# Patient Record
Sex: FEMALE | Race: ASIAN | NOT HISPANIC OR LATINO | Employment: PART TIME | URBAN - METROPOLITAN AREA
[De-identification: names, ages, dates, MRNs, and addresses within clinical notes are randomized per-mention and may not be internally consistent; named-entity substitution may affect disease eponyms.]

---

## 2019-04-02 ENCOUNTER — HOSPITAL ENCOUNTER (EMERGENCY)
Facility: MEDICAL CENTER | Age: 49
End: 2019-04-02

## 2019-04-02 VITALS
RESPIRATION RATE: 16 BRPM | SYSTOLIC BLOOD PRESSURE: 106 MMHG | OXYGEN SATURATION: 98 % | DIASTOLIC BLOOD PRESSURE: 71 MMHG | TEMPERATURE: 98.1 F | HEART RATE: 77 BPM | HEIGHT: 63 IN

## 2019-04-02 LAB — EKG IMPRESSION: NORMAL

## 2019-04-02 PROCEDURE — 302449 STATCHG TRIAGE ONLY (STATISTIC)

## 2019-04-02 PROCEDURE — 93005 ELECTROCARDIOGRAM TRACING: CPT

## 2020-04-13 ENCOUNTER — OCCUPATIONAL MEDICINE (OUTPATIENT)
Dept: URGENT CARE | Facility: CLINIC | Age: 50
End: 2020-04-13
Payer: OTHER MISCELLANEOUS

## 2020-04-13 VITALS
RESPIRATION RATE: 15 BRPM | WEIGHT: 150.3 LBS | SYSTOLIC BLOOD PRESSURE: 116 MMHG | BODY MASS INDEX: 25.04 KG/M2 | TEMPERATURE: 98.3 F | HEIGHT: 65 IN | HEART RATE: 77 BPM | DIASTOLIC BLOOD PRESSURE: 74 MMHG | OXYGEN SATURATION: 96 %

## 2020-04-13 DIAGNOSIS — S61.012A LACERATION OF LEFT THUMB WITHOUT FOREIGN BODY WITHOUT DAMAGE TO NAIL, INITIAL ENCOUNTER: ICD-10-CM

## 2020-04-13 PROCEDURE — 90471 IMMUNIZATION ADMIN: CPT | Performed by: FAMILY MEDICINE

## 2020-04-13 PROCEDURE — 90715 TDAP VACCINE 7 YRS/> IM: CPT | Performed by: FAMILY MEDICINE

## 2020-04-13 PROCEDURE — 12001 RPR S/N/AX/GEN/TRNK 2.5CM/<: CPT | Mod: FA | Performed by: FAMILY MEDICINE

## 2020-04-13 ASSESSMENT — PAIN SCALES - GENERAL
PAINLEVEL: 6=MODERATE PAIN
PAINLEVEL: 6=MODERATE PAIN

## 2020-04-13 ASSESSMENT — ENCOUNTER SYMPTOMS
FOCAL WEAKNESS: 0
COUGH: 0
SENSORY CHANGE: 0
FEVER: 0
TINGLING: 0

## 2020-04-13 NOTE — PATIENT INSTRUCTIONS
Laceration Care, Adult  A laceration is a cut that goes through all layers of the skin. The cut also goes into the tissue that is right under the skin. Some cuts heal on their own. Others need to be closed with stitches (sutures), staples, skin adhesive strips, or wound glue. Taking care of your cut lowers your risk of infection and helps your cut to heal better.  HOW TO TAKE CARE OF YOUR CUT  For stitches or staples:  · Keep the wound clean and dry.  · If you were given a bandage (dressing), you should change it at least one time per day or as told by your doctor. You should also change it if it gets wet or dirty.  · Keep the wound completely dry for the first 24 hours or as told by your doctor. After that time, you may take a shower or a bath. However, make sure that the wound is not soaked in water until after the stitches or staples have been removed.  · Clean the wound one time each day or as told by your doctor:  ¨ Wash the wound with soap and water.  ¨ Rinse the wound with water until all of the soap comes off.  ¨ Pat the wound dry with a clean towel. Do not rub the wound.  · After you clean the wound, put a thin layer of antibiotic ointment on it as told by your doctor. This ointment:  ¨ Helps to prevent infection.  ¨ Keeps the bandage from sticking to the wound.  · Have your stitches or staples removed as told by your doctor.  If your doctor used skin adhesive strips:   · Keep the wound clean and dry.  · If you were given a bandage, you should change it at least one time per day or as told by your doctor. You should also change it if it gets dirty or wet.  · Do not get the skin adhesive strips wet. You can take a shower or a bath, but be careful to keep the wound dry.  · If the wound gets wet, pat it dry with a clean towel. Do not rub the wound.  · Skin adhesive strips fall off on their own. You can trim the strips as the wound heals. Do not remove any strips that are still stuck to the wound. They will  fall off after a while.  If your doctor used wound glue:  · Try to keep your wound dry, but you may briefly wet it in the shower or bath. Do not soak the wound in water, such as by swimming.  · After you take a shower or a bath, gently pat the wound dry with a clean towel. Do not rub the wound.  · Do not do any activities that will make you really sweaty until the skin glue has fallen off on its own.  · Do not apply liquid, cream, or ointment medicine to your wound while the skin glue is still on.  · If you were given a bandage, you should change it at least one time per day or as told by your doctor. You should also change it if it gets dirty or wet.  · If a bandage is placed over the wound, do not let the tape for the bandage touch the skin glue.  · Do not pick at the glue. The skin glue usually stays on for 5-10 days. Then, it falls off of the skin.  General Instructions   · To help prevent scarring, make sure to cover your wound with sunscreen whenever you are outside after stitches are removed, after adhesive strips are removed, or when wound glue stays in place and the wound is healed. Make sure to wear a sunscreen of at least 30 SPF.  · Take over-the-counter and prescription medicines only as told by your doctor.  · If you were given antibiotic medicine or ointment, take or apply it as told by your doctor. Do not stop using the antibiotic even if your wound is getting better.  · Do not scratch or pick at the wound.  · Keep all follow-up visits as told by your doctor. This is important.  · Check your wound every day for signs of infection. Watch for:  ¨ Redness, swelling, or pain.  ¨ Fluid, blood, or pus.  · Raise (elevate) the injured area above the level of your heart while you are sitting or lying down, if possible.  GET HELP IF:  · You got a tetanus shot and you have any of these problems at the injection site:  ¨ Swelling.  ¨ Very bad pain.  ¨ Redness.  ¨ Bleeding.  · You have a fever.  · A wound that was  closed breaks open.  · You notice a bad smell coming from your wound or your bandage.  · You notice something coming out of the wound, such as wood or glass.  · Medicine does not help your pain.  · You have more redness, swelling, or pain at the site of your wound.  · You have fluid, blood, or pus coming from your wound.  · You notice a change in the color of your skin near your wound.  · You need to change the bandage often because fluid, blood, or pus is coming from the wound.  · You start to have a new rash.  · You start to have numbness around the wound.  GET HELP RIGHT AWAY IF:  · You have very bad swelling around the wound.  · Your pain suddenly gets worse and is very bad.  · You notice painful lumps near the wound or on skin that is anywhere on your body.  · You have a red streak going away from your wound.  · The wound is on your hand or foot and you cannot move a finger or toe like you usually can.  · The wound is on your hand or foot and you notice that your fingers or toes look pale or bluish.     This information is not intended to replace advice given to you by your health care provider. Make sure you discuss any questions you have with your health care provider.     Document Released: 06/05/2009 Document Revised: 05/03/2016 Document Reviewed: 12/14/2015  [employer] Interactive Patient Education ©2016 [employer]

## 2020-04-13 NOTE — LETTER
[facility] [address] - [place] [address]  Phone:  [phone] - Fax:  [phone]   [facility] Progress Report and Disability Certification  Date of Service: 4/13/2020   No Show:  No  Date / Time of Next Visit: 4/20/2020   Claim Information   Patient Name: Theron Walker  Claim Number:     Employer:   [employer] Date of Injury: 4/13/2020     Insurer / TPA: EMPLOYERS PREFERRED ID / SSN:     Occupation:   Diagnosis: The encounter diagnosis was Laceration of left thumb without foreign body without damage to nail, initial encounter.    Medical Information   Related to Industrial Injury? Yes    Subjective Complaints:  Date of injury 4/13/2020.  49-year-old  presents with a chief complaint of left thumb tip pad circumferential laceration sustained today 4/13/2020 when cutting fish with a [employer] knife.  There was initial copious bleeding and the employee applied pressure dressing with intermittent persistent bleeding.  Patient denies loss of motor strength in the thumb.  Patient denies loss of sensation in the tip of the thumb.   Objective Findings: Left thumb tip pad: 1 cm circumferential laceration with minimal bleeding, no foreign body, neurovascularly intact, motor strength 5 out of 5 to left thumb flexion and extension at the IP and MCP joint.   Pre-Existing Condition(s):     Assessment:   Initial Visit    Status: Additional Care Required  Permanent Disability:No    Plan:   Comments:Wound cautery and wound dressing    Diagnostics:   Comments:Not applicable    Comments:       Disability Information   Status: Released to Restricted Duty    From:  4/13/2020  Through: 4/20/2020 Restrictions are: Temporary   Physical Restrictions   Sitting:    Standing:    Stooping:    Bending:      Squatting:    Walking:    Climbing:    Pushing:      Pulling:    Other:    Reaching Above Shoulder (L):   Reaching Above Shoulder (R):       Reaching  Below Shoulder (L):    Reaching Below Shoulder (R):      Not to exceed Weight Limits   Carrying(hrs):   Weight Limit(lb):   Lifting(hrs):   Weight  Limit(lb):     Comments: No use of left upper extremity, keep wound clean dry and intact dressing clean and dry    Repetitive Actions   Hands: i.e. Fine Manipulations from Grasping:     Feet: i.e. Operating Foot Controls:     Driving / Operate Machinery:     Physician Name: Bright West M.D. Physician Signature: BRIGHT Christianson M.D. e-Signature: Dr. Esdras Serna, Medical Director   Clinic Name / Location: [address] [place] [place] [address] Clinic Phone Number: Dept: [phone]   Appointment Time: 2:00 Pm Visit Start Time: 2:17 PM   Check-In Time:  2:15 Pm Visit Discharge Time:  ***   Original-Treating Physician or Chiropractor    Page 2-Insurer/TPA    Page 3-Employer    Page 4-Employee

## 2020-04-13 NOTE — PROGRESS NOTES
"Subjective:   Theron Walker is a 49 y.o. female who presents for Laceration (cut left thumb with knife at work today.)    Date of injury 4/13/2020.  49-year-old  presents with a chief complaint of left thumb tip pad circumferential laceration sustained today 4/13/2020 when cutting fish with a sushi knife.  There was initial copious bleeding and the employee applied pressure dressing with intermittent persistent bleeding.  Patient denies loss of motor strength in the thumb.  Patient denies loss of sensation in the tip of the thumb.   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  See the C4 and D-39 forms.    PMH:  has no past medical history on file.  MEDS: No current outpatient medications on file.  ALLERGIES: No Known Allergies  SURGHX: No past surgical history on file.  SOCHX:  reports that she has never smoked. She has never used smokeless tobacco.  FH: Family history was reviewed  Review of Systems   Constitutional: Negative for fever.   Respiratory: Negative for cough.    Neurological: Negative for tingling, sensory change and focal weakness.   All other systems reviewed and are negative.       Objective:   Pulse 77   Temp 36.8 °C (98.3 °F)   Resp 15   Ht 1.651 m (5' 5\")   Wt 68.2 kg (150 lb 4.8 oz)   LMP  (LMP Unknown)   SpO2 96%   Breastfeeding No   BMI 25.01 kg/m²   Physical Exam  Vitals signs and nursing note reviewed.   Constitutional:       General: She is not in acute distress.     Appearance: She is well-developed.   HENT:      Head: Normocephalic and atraumatic.      Right Ear: External ear normal.      Left Ear: External ear normal.      Nose: Nose normal.      Mouth/Throat:      Mouth: Mucous membranes are moist.   Eyes:      Conjunctiva/sclera: Conjunctivae normal.   Cardiovascular:      Rate and Rhythm: Normal rate.      Heart sounds: No murmur.   Pulmonary: "      Effort: Pulmonary effort is normal. No respiratory distress.      Breath sounds: Normal breath sounds.   Abdominal:      General: There is no distension.   Musculoskeletal: Normal range of motion.   Skin:     General: Skin is warm and dry.   Neurological:      General: No focal deficit present.      Mental Status: She is alert and oriented to person, place, and time. Mental status is at baseline.      Gait: Gait (gait at baseline) normal.   Psychiatric:         Judgment: Judgment normal.       Left thumb tip pad: 1 cm circumferential laceration with minimal bleeding, no foreign body, neurovascularly intact, motor strength 5 out of 5 to left thumb flexion and extension at the IP and MCP joint.   Assessment/Plan:   1. Laceration of left thumb without foreign body without damage to nail, initial encounter  - Tdap =>6yo IM      See C4 and D39 forms.  Advised keeping wound dressing clean and dry and no use of the left upper extremity.

## 2020-04-13 NOTE — PROCEDURES
Left finger tip pad wound management  Date/Time: 4/13/2020 2:49 PM  Performed by: Bright West M.D.  Authorized by: Bright West M.D.   Preparation: Patient was prepped and draped in the usual sterile fashion.  Local anesthesia used: no    Anesthesia:  Local anesthesia used: no  Patient tolerance: Patient tolerated the procedure well with no immediate complications  Comments: Pressure was applied to the wound and then Drysol was applied directly to the wound with achievement of adequate hemostasis.  A pressure dressing was applied and the patient was observed for 15 minutes with no further bleeding noted adequate hemostasis had been obtained.

## 2020-04-13 NOTE — LETTER
"EMPLOYEE’S CLAIM FOR COMPENSATION/ REPORT OF INITIAL TREATMENT  FORM C-4    EMPLOYEE’S CLAIM - PROVIDE ALL INFORMATION REQUESTED   First Name  Theron Last Name  Denise Birthdate                    1970                Sex  female Claim Number   Home Address  [address] Age  49 y.o. 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 (if applicable)  cut fish    How did this injury or occupational disease occur? (Be specific an answer in detail. Use additional sheet if necessary)  cutting fish   If you believe that you have an occupational disease, when did you first have knowledge of the disability and it relationship to your employment?  n/a Witnesses to the Accident  Tuhiga      Nature of Injury or Occupational Disease  Laceration  Part(s) of Body Injured or Affected  Thumb (L), N/A, N/A    I certify that the above is true and correct to the best of my knowledge and that I have provided this information in order to obtain the benefits of [place]  I hereby authorize any physician, chiropractor, surgeon, practitioner, or other person, any hospital, including [facility] or [facility], any medical service organization, any insurance company, or other institution or organization to release to each other, any medical or other information, including benefits paid or payable, pertinent to this injury or disease, except information relative to diagnosis, treatment and/or counseling for AIDS, psychological conditions, alcohol or controlled substances, for which I must give specific authorization.  A Photostat of this authorization shall be as valid as the original.     Date4/13/2020   [facility]   Employee’s Signature   THIS REPORT MUST BE COMPLETED AND MAILED WITHIN 3 WORKING DAYS OF TREATMENT   [facility]  Name of [facility]   Date  4/13/2020 Diagnosis  (S61.012A) Laceration of left thumb without foreign body without damage to nail, initial encounter Is there evidence the injured employee was under the influence of alcohol and/or another controlled substance at the time of accident?   Hour  2:17 PM Description of Injury or Disease  The encounter diagnosis was Laceration of left thumb without foreign body without damage to nail, initial encounter. No   Treatment  Wound dressing  Have you advised the patient to remain off work five days or more?     X-Ray Findings    Comments:Not applicable   If Yes   From Date  To Date      From information given by the employee, together with medical evidence, can you directly connect this injury or occupational disease as job incurred?  Yes If No Full Duty No Modified Duty  Yes   Is additional medical care by a physician indicated?  Yes If Modified Duty, Specify any Limitations / Restrictions  No use of left upper extremity   Do you know of any previous injury or disease contributing to this condition or occupational disease?     "                        No   Date  4/13/2020 Print Doctor’s Name Bright West M.D. I certify the employer’s copy of  this form was mailed on:   Address  [address] Insurer’s Use Only     [facility] Zip  [address]    Provider’s Tax ID Number  [id number] Telephone  Dept: [phone]        milli-BRIGHT Robles M.D.   e-Signature: Dr. Esdras Serna,   Medical Director Degree  MD        ORIGINAL-TREATING PHYSICIAN OR CHIROPRACTOR    PAGE 2-INSURER/TPA    PAGE 3-EMPLOYER    PAGE 4-EMPLOYEE             Form C-4 (rev.10/07)              BRIEF DESCRIPTION OF RIGHTS AND BENEFITS  (Pursuant to [place] 616C.050)    Notice of Injury or Occupational Disease (Incident Report Form C-1): If an injury or occupational disease (OD) arises out of and in the course of employment, you must provide written notice to your employer as soon as practicable, but no later than 7 days after the accident or OD. Your employer shall maintain a sufficient supply of the required forms.    Claim for Compensation (Form C-4): If medical treatment is sought, the form C-4 is available at the place of initial treatment. A completed \"Claim for Compensation\" (Form C-4) must be filed within 90 days after an accident or OD. The treating physician or chiropractor must, within 3 working days after treatment, complete and mail to the employer, the employer's insurer and third-party , the Claim for Compensation.    Medical Treatment: If you require medical treatment for your on-the-job injury or OD, you may be required to select a physician or chiropractor from a list provided by your workers’ compensation insurer, if it has contracted with an Organization for Managed Care (MCO) or Preferred Provider Organization (PPO) or providers of health care. If your employer has not entered into a contract with an MCO or PPO, you may select a physician or chiropractor from the Panel of Physicians and Chiropractors. Any " medical costs related to your industrial injury or OD will be paid by your insurer.    Temporary Total Disability (TTD): If your doctor has certified that you are unable to work for a period of at least 5 consecutive days, or 5 cumulative days in a 20-day period, or places restrictions on you that your employer does not accommodate, you may be entitled to TTD compensation.    Temporary Partial Disability (TPD): If the wage you receive upon reemployment is less than the compensation for TTD to which you are entitled, the insurer may be required to pay you TPD compensation to make up the difference. TPD can only be paid for a maximum of 24 months.    Permanent Partial Disability (PPD): When your medical condition is stable and there is an indication of a PPD as a result of your injury or OD, within 30 days, your insurer must arrange for an evaluation by a rating physician or chiropractor to determine the degree of your PPD. The amount of your PPD award depends on the date of injury, the results of the PPD evaluation and your age and wage.    Permanent Total Disability (PTD): If you are medically certified by a treating physician or chiropractor as permanently and totally disabled and have been granted a PTD status by your insurer, you are entitled to receive monthly benefits not to exceed 66 2/3% of your average monthly wage. The amount of your PTD payments is subject to reduction if you previously received a PPD award.    Vocational Rehabilitation Services: You may be eligible for vocational rehabilitation services if you are unable to return to the job due to a permanent physical impairment or permanent restrictions as a result of your injury or occupational disease.    Transportation and Per Tanya Reimbursement: You may be eligible for travel expenses and per tanya associated with medical treatment.    Reopening: You may be able to reopen your claim if your condition worsens after claim closure.    Appeal Process: If  you disagree with a written determination issued by the insurer or the insurer does not respond to your request, you may appeal to the Department of Administration, , by following the instructions contained in your determination letter. You must appeal the determination within 70 days from the date of the determination letter at [address], [address], [place], [place] [address], or [address], [address], [place], [place] [address]. If you disagree with the  decision, you may appeal to the Department of Administration, . You must file your appeal within 30 days from the date of the  decision letter at [address], [address], [place], [place] [address], or [address], [address], [place], [place] [address]. If you disagree with a decision of an , you may file a petition for judicial review with the District Court. You must do so within 30 days of the Appeal Officer’s decision. You may be represented by an  at your own expense or you may contact the [facility] for possible representation.    [place]  for Injured Workers (NAIW): If you disagree with a  decision, you may request that NAIW represent you without charge at an  Hearing. For information regarding denial of benefits, you may contact the [facility] at: [address], [address], [place] [address], [phone], or [address], [address], [place] [address], [phone]    To File a Complaint with the Division: If you wish to file a complaint with the  of the Division of Industrial Relations (DIR),  please contact the Workers’ Compensation Section, [address], [address], [place], [place] [address], telephone (864) [phone], or [address], [address], [place], [place] [address], telephone [phone].    For assistance with Workers’ Compensation Issues: You may  contact the Office of the Governor Consumer Health Assistance, [address] [place] [address], [address], [place], [place] [address], Toll Free 1-932.303.9739, Web site: http://govcha.[facility].[place], E-mail valarie@[facility].[email or web address].                   __________________________________________________________________                                                     ___4/13/2020______        Employee Name / Signature                                                                                                                                              Date                                                                                                                                                                                                     D-2 (rev. 06/18)

## 2020-04-20 ENCOUNTER — OCCUPATIONAL MEDICINE (OUTPATIENT)
Dept: URGENT CARE | Facility: CLINIC | Age: 50
End: 2020-04-20
Payer: OTHER MISCELLANEOUS

## 2020-04-20 VITALS
BODY MASS INDEX: 24.99 KG/M2 | SYSTOLIC BLOOD PRESSURE: 104 MMHG | TEMPERATURE: 98 F | HEART RATE: 70 BPM | RESPIRATION RATE: 20 BRPM | HEIGHT: 65 IN | WEIGHT: 150 LBS | DIASTOLIC BLOOD PRESSURE: 70 MMHG | OXYGEN SATURATION: 97 %

## 2020-04-20 DIAGNOSIS — S61.012A LACERATION OF LEFT THUMB WITHOUT FOREIGN BODY WITHOUT DAMAGE TO NAIL, INITIAL ENCOUNTER: ICD-10-CM

## 2020-04-20 PROCEDURE — 99213 OFFICE O/P EST LOW 20 MIN: CPT | Performed by: PHYSICIAN ASSISTANT

## 2020-04-20 RX ORDER — NAPROXEN 250 MG/1
250 TABLET ORAL 2 TIMES DAILY WITH MEALS
COMMUNITY

## 2020-04-20 RX ORDER — SULFAMETHOXAZOLE AND TRIMETHOPRIM 800; 160 MG/1; MG/1
1 TABLET ORAL 2 TIMES DAILY
Qty: 14 TAB | Refills: 0 | Status: SHIPPED | OUTPATIENT
Start: 2020-04-20 | End: 2020-04-27

## 2020-04-20 ASSESSMENT — ENCOUNTER SYMPTOMS
MYALGIAS: 0
SHORTNESS OF BREATH: 0
VOMITING: 0
CHILLS: 0
NAUSEA: 0
COUGH: 0
FEVER: 0
ABDOMINAL PAIN: 0
HEADACHES: 0
DIZZINESS: 0

## 2020-04-20 NOTE — LETTER
[facility] [address] - [place] [address]  Phone:  [phone] - Fax:  [phone]   [facility] Progress Report and Disability Certification  Date of Service: 4/20/2020   No Show:  No  Date / Time of Next Visit:     Claim Information   Patient Name: Theron Walker  Claim Number:     Employer:   *** Date of Injury: 4/13/2020     Insurer / TPA: [employer] *** ID / SSN:     Occupation:  *** Diagnosis: The encounter diagnosis was Laceration of left thumb without foreign body without damage to nail, initial encounter.    Medical Information   Related to Industrial Injury? Yes ***   Subjective Complaints:  HPI First Visit Copied: Date of injury 4/13/2020.  49-year-old  presents with a chief complaint of left thumb tip pad circumferential laceration sustained today 4/13/2020 when cutting fish with a [employer] knife.  There was initial copious bleeding and the employee applied pressure dressing with intermittent persistent bleeding.  Patient denies loss of motor strength in the thumb.  Patient denies loss of sensation in the tip of the thumb.     HPI: This is a 50-year-old female presenting for follow-up after sustaining a left thumb circumferential laceration on 4/13/2020.  The patient is still experiencing pain that she describes as a constant throb with no radiating pain up the left arm.  She continues to use over-the-counter NSAIDs for pain with minimal relief.  She informs me she has not changed her original bandage that has been on for over 1 week.  She denies any loss of sensation to the left hand or digits.  She denies any fevers chills, nausea or vomiting.   Objective Findings: Physical Exam  Constitutional:       Appearance: Normal appearance. She is not toxic-appearing.   HENT:      Head: Normocephalic and atraumatic.   Eyes:      Conjunctiva/sclera: Conjunctivae normal.   Cardiovascular:      Rate and Rhythm:  Normal rate and regular rhythm.      Pulses: Normal pulses.   Pulmonary:      Effort: Pulmonary effort is normal.      Breath sounds: Normal breath sounds. No wheezing.   Musculoskeletal:      Comments: Limited range of motion to the left first digit due to pain.  Limited flexion and extension to the MCP and IP joints.  Mild tenderness to palpation over the left first digit.  Full sensation to the bilateral upper extremities and digits.   Skin:     Comments: Open circumferential laceration of the left distal thumb.  Mild erythema and edema to the left thumb.  No streaking appreciated.  Capillary refill less than 2 seconds to the bilateral upper extremities.   Neurological:      Mental Status: She is alert.        Pre-Existing Condition(s):     Assessment:   Condition Improved    Status: Additional Care Required  Permanent Disability:No    Plan: Medication  Comments:Daily dressing changes with topical antibiotic application.  Allow the wound to dry in her for 2 to 3 hours a day.  Bactrim started.  Over-the-counter NSAIDs and Tylenol for pain relief.     Diagnostics:      Comments:  No use of left upper extremity.  Return to clinic on 4/27/2020 for follow-up.  Keep wound clean and dry.    Disability Information   Status: Released to Restricted Duty    From:     Through:   Restrictions are:     Physical Restrictions   Sitting:    Standing:    Stooping:    Bending:      Squatting:    Walking:    Climbing:    Pushing:      Pulling:    Other:    Reaching Above Shoulder (L):   Reaching Above Shoulder (R):       Reaching Below Shoulder (L):    Reaching Below Shoulder (R):      Not to exceed Weight Limits   Carrying(hrs):   Weight Limit(lb):   Lifting(hrs):   Weight  Limit(lb):     Comments: No use of the left upper extremity for the next week.  Follow-up on 4/27/2020.  Keep wound clean and dry.    Repetitive Actions   Hands: i.e. Fine Manipulations from Grasping:     Feet: i.e. Operating Foot Controls:     Driving / Operate  Machinery:     Physician Name: Vinnie Jay P.A.-C. Physician Signature:   e-Signature: Dr. Esdras Serna, Medical Director   Clinic Name / Location: [address]  [place] [address] Clinic Phone Number: Dept: [phone]   Appointment Time: 11:15 Am Visit Start Time: 11:21 AM   Check-In Time:  11:14 Am Visit Discharge Time:  ***   Original-Treating Physician or Chiropractor    Page 2-Insurer/TPA    Page 3-Employer    Page 4-Employee

## 2020-04-20 NOTE — PROGRESS NOTES
Subjective:      Theron Walker is a 50 y.o. female who presents with Follow-Up (DOI: 04/13/20, Left hand-thumb, patient still feels pain, difficulty sleeping from it)            HPI Subjective Complaints: HPI First Visit Copied: Date of injury 4/13/2020.  49-year-old  presents with a chief complaint of left thumb tip pad circumferential laceration sustained today 4/13/2020 when cutting fish with a [employer] knife.  There was initial copious bleeding and the employee applied pressure dressing with intermittent persistent bleeding.  Patient denies loss of motor strength in the thumb.  Patient denies loss of sensation in the tip of the thumb.     HPI: This is a 50-year-old female presenting for follow-up after sustaining a left thumb circumferential laceration on 4/13/2020.  The patient is still experiencing pain that she describes as a constant throb with no radiating pain up the left arm.  She continues to use over-the-counter NSAIDs for pain with minimal relief.  She informs me she has not changed her original bandage that has been on for over 1 week.  She denies any loss of sensation to the left hand or digits.  She denies any fevers chills, nausea or vomiting.      PMH:  has no past medical history on file.  MEDS:   Current Outpatient Medications:   •  naproxen (NAPROSYN) 250 MG Tab, Take 250 mg by mouth 2 times a day, with meals., Disp: , Rfl:   ALLERGIES: No Known Allergies  SURGHX: History reviewed. No pertinent surgical history.  SOCHX:  reports that she has never smoked. She has never used smokeless tobacco. She reports previous alcohol use. She reports that she does not use drugs.  FH: Family history was reviewed, no pertinent findings to report      Review of Systems   Constitutional: Negative for chills and fever.   Respiratory: Negative for cough and shortness of breath.    Cardiovascular: Negative for chest pain.   Gastrointestinal: Negative for abdominal pain, nausea and vomiting.  "  Musculoskeletal: Negative for myalgias.        Pain with movement of the left Thumb. No loss of sensation.    Skin: Negative for rash.        Open laceration Distal left thumb. No streaking noted.    Neurological: Negative for dizziness and headaches.          Objective:     /70 (BP Location: Right arm, Patient Position: Sitting, BP Cuff Size: Adult)   Pulse 70   Temp 36.7 °C (98 °F) (Temporal)   Resp 20   Ht 1.651 m (5' 5\")   Wt 68 kg (150 lb)   LMP  (LMP Unknown)   SpO2 97%   BMI 24.96 kg/m²      Physical Exam  Constitutional:       Appearance: Normal appearance. She is not toxic-appearing.   HENT:      Head: Normocephalic and atraumatic.   Eyes:      Conjunctiva/sclera: Conjunctivae normal.   Cardiovascular:      Rate and Rhythm: Normal rate and regular rhythm.      Pulses: Normal pulses.   Pulmonary:      Effort: Pulmonary effort is normal.      Breath sounds: Normal breath sounds. No wheezing.   Musculoskeletal:      Comments: Limited range of motion to the left first digit due to pain.  Limited flexion and extension to the MCP and IP joints.  Mild tenderness to palpation over the left first digit.  Full sensation to the bilateral upper extremities and digits.   Skin:     Comments: Open circumferential laceration of the left distal thumb.  Mild erythema and edema to the left thumb.  No streaking appreciated.  Capillary refill less than 2 seconds to the bilateral upper extremities.   Neurological:      Mental Status: She is alert.                 Assessment/Plan:     1. Laceration of left thumb without foreign body without damage to nail, initial encounter    - sulfamethoxazole-trimethoprim (BACTRIM DS) 800-160 MG tablet; Take 1 Tab by mouth 2 times a day for 7 days.  Dispense: 14 Tab; Refill: 0    Patient was educated on wound care instructions.  Her niece help translate the instructions and instructions were provided in writing.  She was advised to daily change her wound dressing, apply " antibiotic lotion and allow the wound to be open to air for 2 to 3 hours/day.    Take oral antibiotics as prescribed.    Patient was instructed to return to the clinic if symptoms worsen or she notices any signs of infection such as increasing redness, fevers, chills, nausea or vomiting.    Differential diagnoses, supportive care, and indications for immediate follow-up discussed with patient.   Instructed to return to clinic or nearest emergency department for any change in condition, further concerns, or worsening of symptoms.

## 2020-04-20 NOTE — LETTER
[facility]  [address] - [place] [address]  Phone:  [phone] - Fax:  [phone]   [facility] Progress Report and Disability Certification  Date of Service: 4/20/2020   No Show:  No  Date / Time of Next Visit: 4/27/2020   Claim Information   Patient Name: Theron Walker  Claim Number:     Employer:   [employer] Date of Injury: 4/13/2020     Insurer / TPA: [employer] ID / SSN:     Occupation:   Diagnosis: The encounter diagnosis was Laceration of left thumb without foreign body without damage to nail, initial encounter.    Medical Information   Related to Industrial Injury? Yes    Subjective Complaints:  HPI First Visit Copied: Date of injury 4/13/2020.  49-year-old  presents with a chief complaint of left thumb tip pad circumferential laceration sustained today 4/13/2020 when cutting fish with a [employer] knife.  There was initial copious bleeding and the employee applied pressure dressing with intermittent persistent bleeding.  Patient denies loss of motor strength in the thumb.  Patient denies loss of sensation in the tip of the thumb.     HPI: This is a 50-year-old female presenting for follow-up after sustaining a left thumb circumferential laceration on 4/13/2020.  The patient is still experiencing pain that she describes as a constant throb with no radiating pain up the left arm.  She continues to use over-the-counter NSAIDs for pain with minimal relief.  She informs me she has not changed her original bandage that has been on for over 1 week.  She denies any loss of sensation to the left hand or digits.  She denies any fevers chills, nausea or vomiting.   Objective Findings: Physical Exam  Constitutional:       Appearance: Normal appearance. She is not toxic-appearing.   HENT:      Head: Normocephalic and atraumatic.   Eyes:      Conjunctiva/sclera: Conjunctivae normal.   Cardiovascular:      Rate and  Rhythm: Normal rate and regular rhythm.      Pulses: Normal pulses.   Pulmonary:      Effort: Pulmonary effort is normal.      Breath sounds: Normal breath sounds. No wheezing.   Musculoskeletal:      Comments: Limited range of motion to the left first digit due to pain.  Limited flexion and extension to the MCP and IP joints.  Mild tenderness to palpation over the left first digit.  Full sensation to the bilateral upper extremities and digits.   Skin:     Comments: Open circumferential laceration of the left distal thumb.  Mild erythema and edema to the left thumb.  No streaking appreciated.  Capillary refill less than 2 seconds to the bilateral upper extremities.   Neurological:      Mental Status: She is alert.        Pre-Existing Condition(s):     Assessment:   Condition Improved    Status: Additional Care Required  Permanent Disability:No    Plan: Medication  Comments:Daily dressing changes with topical antibiotic application.  Allow the wound to dry in her for 2 to 3 hours a day.  Bactrim started.  Over-the-counter NSAIDs and Tylenol for pain relief.     Diagnostics:      Comments:  No use of left upper extremity.  Return to clinic on 4/27/2020 for follow-up.  Keep wound clean and dry.    Disability Information   Status: Released to Restricted Duty    From:  4/20/2020  Through: 4/27/2020 Restrictions are: Temporary   Physical Restrictions   Sitting:    Standing:    Stooping:    Bending:      Squatting:    Walking:    Climbing:    Pushing:      Pulling:    Other:    Reaching Above Shoulder (L):   Reaching Above Shoulder (R):       Reaching Below Shoulder (L):    Reaching Below Shoulder (R):      Not to exceed Weight Limits   Carrying(hrs):   Weight Limit(lb):   Lifting(hrs):   Weight  Limit(lb):     Comments: No use of the left upper extremity for the next week.  Follow-up on 4/27/2020.  Keep wound clean and dry.    Repetitive Actions   Hands: i.e. Fine Manipulations from Grasping:     Feet: i.e. Operating Foot  Controls:     Driving / Operate Machinery:     Physician Name: Winnie Jay P.A.-C. Physician Signature: WINNIE Yun P.A.-C. e-Signature: Dr. Esdras Serna, Medical Director   Clinic Name / Location: [address]  [place] [address] Clinic Phone Number: Dept: [phone]   Appointment Time: 11:15 Am Visit Start Time: 11:21 AM   Check-In Time:  11:14 Am Visit Discharge Time:  12:59PM   Original-Treating Physician or Chiropractor    Page 2-Insurer/TPA    Page 3-Employer    Page 4-Employee

## 2020-04-27 ENCOUNTER — OCCUPATIONAL MEDICINE (OUTPATIENT)
Dept: URGENT CARE | Facility: CLINIC | Age: 50
End: 2020-04-27
Payer: OTHER MISCELLANEOUS

## 2020-04-27 VITALS
DIASTOLIC BLOOD PRESSURE: 60 MMHG | HEIGHT: 65 IN | OXYGEN SATURATION: 99 % | TEMPERATURE: 97.9 F | BODY MASS INDEX: 24.99 KG/M2 | RESPIRATION RATE: 16 BRPM | HEART RATE: 72 BPM | SYSTOLIC BLOOD PRESSURE: 100 MMHG | WEIGHT: 150 LBS

## 2020-04-27 DIAGNOSIS — S61.012D LACERATION OF LEFT THUMB WITHOUT FOREIGN BODY WITHOUT DAMAGE TO NAIL, SUBSEQUENT ENCOUNTER: Primary | ICD-10-CM

## 2020-04-27 PROCEDURE — 99214 OFFICE O/P EST MOD 30 MIN: CPT | Performed by: PHYSICIAN ASSISTANT

## 2020-04-27 NOTE — PROGRESS NOTES
Subjective:      Pt is a 50 y.o. female who presents with Finger Injury (Follow up left thumb laceration )       line used:  [place]  [facility] Subjective Complaints: HPI First Visit Copied: Date of injury 4/13/2020.  49-year-old  presents with a chief complaint of left thumb tip pad circumferential laceration sustained today 4/13/2020 when cutting fish with a [employer] knife.  There was initial copious bleeding and the employee applied pressure dressing with intermittent persistent bleeding.  Patient denies loss of motor strength in the thumb.  Patient denies loss of sensation in the tip of the thumb.     HPI: This is a 50-year-old female presenting for follow-up after sustaining a left thumb circumferential laceration on 4/13/2020.  The patient is here for her 3rd visit and notes pain has greatly resolved and only took 3 days of the antibiotic and then stopped when the pain ceased.  She continues to use over-the-counter NSAIDs for pain with significant relief.  She denies any loss of sensation to the left hand or digits.  She denies any fevers chills, nausea or vomiting. Pt has not taken any Rx medications for this condition. Pt states the pain is a 0-1/10, aching in nature and worse at night. Pt denies CP, SOB, NVD, paresthesias, headaches, dizziness, change in vision, hives, or other joint pain. The pt's medication list, problem list, and allergies have been evaluated and reviewed during today's visit.       HPI  PMH:  Negative per pt.      PSH:  Negative per pt.      Fam Hx:  the patient's family history is not pertinent to their current complaint    Soc HX:  Social History     Socioeconomic History   • Marital status:      Spouse name: Not on file   • Number of children: Not on file   • Years of education: Not on file   • Highest education level: Not on file   Occupational History   • Not on file   Social Needs   • Financial resource strain: Not on file   • Food insecurity     Worry: Not on  file     Inability: Not on file   • Transportation needs     Medical: Not on file     Non-medical: Not on file   Tobacco Use   • Smoking status: Never Smoker   • Smokeless tobacco: Never Used   Substance and Sexual Activity   • Alcohol use: Not Currently   • Drug use: Never   • Sexual activity: Not on file   Lifestyle   • Physical activity     Days per week: Not on file     Minutes per session: Not on file   • Stress: Not on file   Relationships   • Social connections     Talks on phone: Not on file     Gets together: Not on file     Attends [employer] service: Not on file     Active member of club or organization: Not on file     Attends meetings of clubs or organizations: Not on file     Relationship status: Not on file   • Intimate partner violence     Fear of current or ex partner: Not on file     Emotionally abused: Not on file     Physically abused: Not on file     Forced sexual activity: Not on file   Other Topics Concern   • Not on file   Social History Narrative   • Not on file         Medications:    Current Outpatient Medications:   •  naproxen (NAPROSYN) 250 MG Tab, Take 250 mg by mouth 2 times a day, with meals., Disp: , Rfl:   •  sulfamethoxazole-trimethoprim (BACTRIM DS) 800-160 MG tablet, Take 1 Tab by mouth 2 times a day for 7 days., Disp: 14 Tab, Rfl: 0      Allergies:  Patient has no known allergies.    ROS    Constitutional: Negative for fever, chills and malaise/fatigue.   HENT: Negative for congestion and sore throat.    Eyes: Negative for blurred vision, double vision and photophobia.   Respiratory: Negative for cough and shortness of breath.  Cardiovascular: Negative for chest pain and palpitations.   Gastrointestinal: Negative for heartburn, nausea, vomiting, abdominal pain, diarrhea and constipation.   Genitourinary: Negative for dysuria and flank pain.   Musculoskeletal: POS for left thumb stiffness and limited ROM and myalgias.   Skin: Negative for itching and rash.   Neurological:  "Negative for dizziness, tingling and headaches.   Endo/Heme/Allergies: Does not bruise/bleed easily.   Psychiatric/Behavioral: Negative for depression. The patient is not nervous/anxious.         Objective:     /60   Pulse 72   Temp 36.6 °C (97.9 °F) (Temporal)   Resp 16   Ht 1.651 m (5' 5\")   Wt 68 kg (150 lb)   LMP  (LMP Unknown)   SpO2 99%   BMI 24.96 kg/m²      Physical Exam    Left thumb: +ecchymoses, no edema, no erythema, no signs of infection present, +TTP. +AROM, +SILT, STR 5/5.    Constitutional: PT is oriented to person, place, and time. PT appears well-developed and well-nourished. No distress.   HENT:   Head: Normocephalic and atraumatic.   Mouth/Throat: Oropharynx is clear and moist. No oropharyngeal exudate.   Eyes: Conjunctivae normal and EOM are normal. Pupils are equal, round, and reactive to light.   Neck: Normal range of motion. Neck supple. No thyromegaly present.   Cardiovascular: Normal rate, regular rhythm, normal heart sounds and intact distal pulses.  Exam reveals no gallop and no friction rub.    No murmur heard.  Pulmonary/Chest: Effort normal and breath sounds normal. No respiratory distress. PT has no wheezes. PT has no rales. Pt exhibits no tenderness.   Abdominal: Soft. Bowel sounds are normal. PT exhibits no distension and no mass. There is no tenderness. There is no rebound and no guarding.   Neurological: PT is alert and oriented to person, place, and time. PT has normal reflexes. No cranial nerve deficit.   Skin: Skin is warm and dry. No rash noted. PT is not diaphoretic. No erythema.       Psychiatric: PT has a normal mood and affect. PT behavior is normal. Judgment and thought content normal.        Assessment/Plan:       1. Laceration of left thumb without foreign body without damage to nail, subsequent encounter      RICE therapy discussed  Gentle ROM exercises discussed  NWB left hand: work restrictions  Ice/heat therapy discussed  OTC ibuprofen for pain " control  Rest, fluids encouraged.  AVS with medical info given.  Pt was in full understanding and agreement with the plan.  Follow-up 7 days or as needed if symptoms worsen or fail to improve.

## 2020-04-27 NOTE — LETTER
[facility]  [address] - [place] [address]  Phone:  [phone] - Fax:  [phone]   [facility] Progress Report and Disability Certification  Date of Service: 4/27/2020   No Show:  No  Date / Time of Next Visit: 5/4/2020   Claim Information   Patient Name: Theron Walker  Claim Number:     Employer:   [employer] Date of Injury: 4/13/2020     Insurer / TPA: Employers [employer]erred ins. co ID / SSN:     Occupation:   Diagnosis: The encounter diagnosis was Laceration of left thumb without foreign body without damage to nail, subsequent encounter.    Medical Information   Related to Industrial Injury? Yes    Subjective Complaints:   line used:  [place]  [facility] Subjective Complaints: HPI First Visit Copied: Date of injury 4/13/2020.  49-year-old  presents with a chief complaint of left thumb tip pad circumferential laceration sustained today 4/13/2020 when cutting fish with a [employer] knife.  There was initial copious bleeding and the employee applied pressure dressing with intermittent persistent bleeding.  Patient denies loss of motor strength in the thumb.  Patient denies loss of sensation in the tip of the thumb.     HPI: This is a 50-year-old female presenting for follow-up after sustaining a left thumb circumferential laceration on 4/13/2020.  The patient is here for her 3rd visit and notes pain has greatly resolved and only took 3 days of the antibiotic and then stopped when the pain ceased.  She continues to use over-the-counter NSAIDs for pain with significant relief.  She denies any loss of sensation to the left hand or digits.  She denies any fevers chills, nausea or vomiting. Pt has not taken any Rx medications for this condition. Pt states the pain is a 0-1/10, aching in nature and worse at night. Pt denies CP, SOB, NVD, paresthesias, headaches, dizziness, change in vision, hives, or other joint pain. The pt's  medication list, problem list, and allergies have been evaluated and reviewed during today's visit.     Objective Findings: Left thumb: +ecchymoses, no edema, no erythema, no signs of infection present, +TTP. +AROM, +SILT, STR 5/5.     Pre-Existing Condition(s):     Assessment:   Condition Improved    Status: Additional Care Required  Permanent Disability:No    Plan: Medication  Comments:OTC ibuprofen    Diagnostics:      Comments:       Disability Information   Status: Released to Restricted Duty    From:  2020  Through: 2020 Restrictions are: Temporary   Physical Restrictions   Sitting:    Standing:    Stooping:    Bending:      Squatting:    Walking:    Climbin hrs/day Pushin hrs/day  Comments:left hand only   Pullin hrs/day  Comments:left hand only Other:    Reaching Above Shoulder (L):   Reaching Above Shoulder (R):       Reaching Below Shoulder (L):    Reaching Below Shoulder (R):      Not to exceed Weight Limits   Carrying(hrs):   Weight Limit(lb): < or = to 10 pounds  Comments:left hand only Lifting(hrs):   Weight  Limit(lb): < or = to 10 pounds  Comments:left hand only   Comments: Rest, ice/heat therapy discussed, gentle ROM exercises encouraged, OTC ibuprofen,  work restrictions, follow up in 7 days for likely D/C MMI      Repetitive Actions   Hands: i.e. Fine Manipulations from Graspin hrs/day  Comments:left hand only   Feet: i.e. Operating Foot Controls:     Driving / Operate Machinery:     Physician Name: Tico Barrera P.A.-C. Physician Signature: TICO Azul P.A.-C. e-Signature: Dr. Esdras Serna, Medical Director   Clinic Name / Location: [address] [address] Clinic Phone Number: Dept: [phone]   Appointment Time: 10:00 Am Visit Start Time: 10:22 AM   Check-In Time:  10:12 Am Visit Discharge Time:  10:50AM   Original-Treating Physician or Chiropractor    Page 2-Insurer/TPA    Page 3-Employer     Page 4-Employee

## 2020-05-04 ENCOUNTER — OCCUPATIONAL MEDICINE (OUTPATIENT)
Dept: URGENT CARE | Facility: CLINIC | Age: 50
End: 2020-05-04
Payer: COMMERCIAL

## 2020-05-04 VITALS
WEIGHT: 150 LBS | TEMPERATURE: 98.1 F | HEART RATE: 68 BPM | RESPIRATION RATE: 16 BRPM | HEIGHT: 65 IN | OXYGEN SATURATION: 98 % | DIASTOLIC BLOOD PRESSURE: 68 MMHG | SYSTOLIC BLOOD PRESSURE: 110 MMHG | BODY MASS INDEX: 24.99 KG/M2

## 2020-05-04 DIAGNOSIS — S61.012D LACERATION OF LEFT THUMB WITHOUT FOREIGN BODY WITHOUT DAMAGE TO NAIL, SUBSEQUENT ENCOUNTER: ICD-10-CM

## 2020-05-04 PROCEDURE — 99214 OFFICE O/P EST MOD 30 MIN: CPT | Performed by: PHYSICIAN ASSISTANT

## 2020-05-04 ASSESSMENT — ENCOUNTER SYMPTOMS
SPEECH CHANGE: 0
DOUBLE VISION: 0
TREMORS: 0
NAUSEA: 0
WEAKNESS: 0
HEADACHES: 0
PALPITATIONS: 0
SEIZURES: 0
COUGH: 0
ABDOMINAL PAIN: 0
CHILLS: 0
TINGLING: 0
DIZZINESS: 0
SHORTNESS OF BREATH: 0
SENSORY CHANGE: 0
BLURRED VISION: 0
FOCAL WEAKNESS: 0
ORTHOPNEA: 0
FEVER: 0
LOSS OF CONSCIOUSNESS: 0
DIARRHEA: 0
VOMITING: 0
CLAUDICATION: 0

## 2020-05-04 NOTE — PROGRESS NOTES
Subjective:      Theron Walker is a 50 y.o. female who presents with Finger Injury (Foloww up left thumb inury)        HPI    HPI Subjective Complaints: HPI First Visit Copied: Date of injury 4/13/2020.  49-year-old  presents with a chief complaint of left thumb tip pad circumferential laceration sustained today 4/13/2020 when cutting fish with a [employer] knife.  There was initial copious bleeding and the employee applied pressure dressing with intermittent persistent bleeding.  Patient denies loss of motor strength in the thumb.  Patient denies loss of sensation in the tip of the thumb.      HPI: This is a 50-year-old female presenting for follow-up after sustaining a left thumb circumferential laceration on 4/13/2020.  The patient is here for her 4th visit and notes pain has greatly resolved and only took 3 days of the antibiotic and then stopped when the pain ceased.  She continues to use over-the-counter NSAIDs for pain with significant relief.  She denies any loss of sensation to the left hand or digits.  She denies any fevers chills, nausea or vomiting. Pt has not taken any Rx medications for this condition. Pt states the pain is a 0-1/10, aching in nature and worse at night. Pt denies CP, SOB, NVD, paresthesias, headaches, dizziness, change in vision, hives, or other joint pain. The pt's medication list, problem list, and allergies have been evaluated and reviewed during today's visit.     Review of Systems   Constitutional: Negative for chills and fever.   Eyes: Negative for blurred vision and double vision.   Respiratory: Negative for cough and shortness of breath.    Cardiovascular: Negative for chest pain, palpitations, orthopnea, claudication and leg swelling.   Gastrointestinal: Negative for abdominal pain, diarrhea, nausea and vomiting.   Musculoskeletal:        Left thumb pain   Skin: Negative for rash.   Neurological: Negative for dizziness, tingling, tremors, sensory change, speech change,  "focal weakness, seizures, loss of consciousness, weakness and headaches.   All other systems reviewed and are negative.    Pt PMH, SocHx, SurgHx, FamHx, Drug allergies and medications reviewed with pt/EPIC.  Family history reviewed, it is not pertinent to this complaint.       Objective:     Blood Pressure 110/68   Pulse 68   Temperature 36.7 °C (98.1 °F) (Temporal)   Respiration 16   Height 1.651 m (5' 5\")   Weight 68 kg (150 lb)   Last Menstrual Period  (LMP Unknown)   Oxygen Saturation 98%   Body Mass Index 24.96 kg/m²      Physical Exam    Constitutional: Pt is oriented to person, place, and time.  Appears well-developed and well-nourished. No distress.   HENT:   Mouth/Throat: Oropharynx is clear and moist.   Eyes: Conjunctivae are normal.   Cardiovascular: Normal rate.    Pulmonary/Chest: Effort normal.   Musculoskeletal: Healing laceration of the left thumb.  Minimal swelling with full ROM.  Neurological: Pt is alert and oriented to person, place, and time. Coordination normal.   Skin: Skin is warm. Pt is not diaphoretic. No erythema.   Psychiatric: Pt has a normal mood and affect.  Behavior is normal.          Assessment/Plan:       1. Laceration of left thumb without foreign body without damage to nail, subsequent encounter  - discharged, MMI    "

## 2020-05-04 NOTE — LETTER
[facility]  [address] - [place] [address]  Phone:  [phone] - Fax:  [phone]   [facility] Progress Report and Disability Certification  Date of Service: 5/4/2020   No Show:  No  Date / Time of Next Visit:     Claim Information   Patient Name: Theron Walker  Claim Number:     Employer:   [employer] Date of Injury: 4/13/2020     Insurer / TPA: [employer] ID / SSN:     Occupation:   Diagnosis: The encounter diagnosis was Laceration of left thumb without foreign body without damage to nail, subsequent encounter.    Medical Information   Related to Industrial Injury? Yes    Subjective Complaints:    HPI Subjective Complaints: HPI First Visit Copied: Date of injury 4/13/2020.  49-year-old  presents with a chief complaint of left thumb tip pad circumferential laceration sustained today 4/13/2020 when cutting fish with a [employer] knife.  There was initial copious bleeding and the employee applied pressure dressing with intermittent persistent bleeding.  Patient denies loss of motor strength in the thumb.  Patient denies loss of sensation in the tip of the thumb.      HPI: This is a 50-year-old female presenting for follow-up after sustaining a left thumb circumferential laceration on 4/13/2020.  The patient is here for her 4th visit and notes pain has greatly resolved and only took 3 days of the antibiotic and then stopped when the pain ceased.  She continues to use over-the-counter NSAIDs for pain with significant relief.  She denies any loss of sensation to the left hand or digits.  She denies any fevers chills, nausea or vomiting. Pt has not taken any Rx medications for this condition. Pt states the pain is a 0-1/10, aching in nature and worse at night. Pt denies CP, SOB, NVD, paresthesias, headaches, dizziness, change in vision, hives, or other joint pain. The pt's medication list, problem list, and  allergies have been evaluated and reviewed during today's visit.   Objective Findings: Constitutional: Pt is oriented to person, place, and time.  Appears well-developed and well-nourished. No distress.   HENT:   Mouth/Throat: Oropharynx is clear and moist.   Eyes: Conjunctivae are normal.   Cardiovascular: Normal rate.    Pulmonary/Chest: Effort normal.   Musculoskeletal: Healing laceration of the left thumb.  Minimal swelling with full ROM.  Neurological: Pt is alert and oriented to person, place, and time. Coordination normal.   Skin: Skin is warm. Pt is not diaphoretic. No erythema.   Psychiatric: Pt has a normal mood and affect.  Behavior is normal.      Pre-Existing Condition(s):     Assessment:   Condition Improved    Status: Discharged /  MMI  Permanent Disability:No    Plan:      Diagnostics:      Comments:       Disability Information   Status: Released to Full Duty    From:     Through:   Restrictions are:     Physical Restrictions   Sitting:    Standing:    Stooping:    Bending:      Squatting:    Walking:    Climbing:    Pushing:      Pulling:    Other:    Reaching Above Shoulder (L):   Reaching Above Shoulder (R):       Reaching Below Shoulder (L):    Reaching Below Shoulder (R):      Not to exceed Weight Limits   Carrying(hrs):   Weight Limit(lb):   Lifting(hrs):   Weight  Limit(lb):     Comments:      Repetitive Actions   Hands: i.e. Fine Manipulations from Grasping:     Feet: i.e. Operating Foot Controls:     Driving / Operate Machinery:     Physician Name: Chema Raygoza P.A.-C. Physician Signature: [email or web address] RAYGOZA P.A.-C. e-Signature: Dr. Esdras Serna, Medical Director   Clinic Name / Location: [address] [place] [address] Clinic Phone Number: Dept: [phone]   Appointment Time: 10:00 Am Visit Start Time: 10:10 AM   Check-In Time:  10:08 Am Visit Discharge Time:  10:40AM   Original-Treating Physician or Chiropractor    Page  2-Insurer/TPA    Page 3-Employer    Page 4-Employee

## 2025-05-27 ENCOUNTER — HOSPITAL ENCOUNTER (OUTPATIENT)
Dept: RADIOLOGY | Facility: MEDICAL CENTER | Age: 55
End: 2025-05-27
Attending: INTERNAL MEDICINE
Payer: COMMERCIAL

## 2025-05-29 ENCOUNTER — OFFICE VISIT (OUTPATIENT)
Dept: URGENT CARE | Facility: CLINIC | Age: 55
End: 2025-05-29
Payer: COMMERCIAL

## 2025-05-29 VITALS
OXYGEN SATURATION: 98 % | WEIGHT: 149.6 LBS | HEART RATE: 91 BPM | RESPIRATION RATE: 20 BRPM | TEMPERATURE: 97.8 F | SYSTOLIC BLOOD PRESSURE: 128 MMHG | HEIGHT: 61 IN | DIASTOLIC BLOOD PRESSURE: 85 MMHG | BODY MASS INDEX: 28.25 KG/M2

## 2025-05-29 DIAGNOSIS — R10.11 POSTPRANDIAL RUQ PAIN: Primary | ICD-10-CM

## 2025-05-29 NOTE — PROGRESS NOTES
"Urgent Care Visit Note  [facility]    05/29/25    Theron Walker     [address] [place] [address]     PCP: Pcp Pt States None     Subjective:     Chief Complaint   Patient presents with    GI Problem     6 months       HPI:  Theron Walker is a 55 y.o. female who presents for 6 months of postprandial epigastric abdominal pain in addition to pressure and heartburn in her chest after eating meat.  Symptoms began gradually without any obvious precipitating factors.  Reports that she has been eating less due to the pressure and discomfort she gets after eating.  Reports that the pressure will resolve within 1 to 2 hours after eating.    No nausea or vomiting.  Normal daily bowel movements.  Does endorse GERD symptoms chronically.  Has not seen a GI specialist per report.    ROS:  ROS     CURRENT MEDICATIONS:  Encounter Medications with Dispense Information[1]    ALLERGIES:   Allergies[2]    PROBLEM LIST:    does not have a problem list on file.    Allergies, Medications, & Tobacco/Substance Use were reconciled by the Medical Assistant and reviewed by myself.     Objective:     /85   Pulse 91   Temp 36.6 °C (97.8 °F) (Temporal)   Resp 20   Ht 1.549 m (5' 1\")   Wt 67.9 kg (149 lb 9.6 oz)   SpO2 98%   BMI 28.27 kg/m²     Physical Exam  HENT:      Head: Normocephalic and atraumatic.      Right Ear: External ear normal.      Left Ear: External ear normal.      Nose: Nose normal.      Mouth/Throat:      Mouth: Mucous membranes are moist.   Eyes:      General:         Right eye: No discharge.         Left eye: No discharge.      Extraocular Movements: Extraocular movements intact.      Pupils: Pupils are equal, round, and reactive to light.   Cardiovascular:      Rate and Rhythm: Normal rate and regular rhythm.      Pulses: Normal pulses.   Pulmonary:      Effort: Pulmonary effort is normal.      Breath sounds: Normal breath sounds.   Abdominal:      General: Abdomen is flat.      Tenderness: There is no " abdominal tenderness. There is no right CVA tenderness, left CVA tenderness, guarding or rebound.   Skin:     General: Skin is warm.      Capillary Refill: Capillary refill takes less than 2 seconds.   Neurological:      Mental Status: She is alert and oriented to person, place, and time.   Psychiatric:         Mood and Affect: Mood normal.         Behavior: Behavior normal.               Assessment/Plan:     Patient's history and physical exam consistent with:    Assessment & Plan  Postprandial RUQ pain    Orders:    US-ABDOMEN COMPLETE SURVEY; Future    Referral to Gastroenterology      Postprandial epigastric pain could be gallbladder pathology so right upper quadrant ultrasound ordered.  Will inform patient of results after she gets this done.  I recommend that he see a gastroenterologist as she may need future endoscopy to evaluate for early satiety and postprandial chest and upper abdominal pressure.  Educated on return precautions and ER precautions regarding new or worsening symptoms.    Discussed differential diagnosis, management options, risks/benefits, and alternatives to planned treatment. Pt expressed understanding and the treatment plan was agreed upon. Questions were encouraged and answered. Pt encouraged to return to urgent care as needed if new or worsening symptoms or if there is no improvement in condition. Pt educated in red flags and indications to immediately call 911 or present to the Emergency Department. Advised the patient to follow-up with the primary care physician for recheck, reevaluation, and further management.    I personally reviewed prior external notes and test results pertinent to today's visit. I have independently reviewed and interpreted all diagnostics ordered during this visit.    Please note that this dictation was created using voice recognition software. I have made a reasonable attempt to correct obvious errors, but I expect that there are errors of grammar and possibly  content that I did not discover before finalizing the note.    This note was electronically signed by Mic Gant MD               [1]   Current Outpatient Medications   Medication Sig Refill Last Dispense    naproxen (NAPROSYN) 250 MG Tab Take 250 mg by mouth 2 times a day, with meals. (Patient not taking: Reported on 5/29/2025)  Unknown (patient-reported)   [2] No Known Allergies

## 2025-06-05 ENCOUNTER — HOSPITAL ENCOUNTER (OUTPATIENT)
Dept: RADIOLOGY | Facility: MEDICAL CENTER | Age: 55
End: 2025-06-05
Attending: INTERNAL MEDICINE
Payer: COMMERCIAL

## 2025-06-05 ENCOUNTER — APPOINTMENT (OUTPATIENT)
Dept: RADIOLOGY | Facility: MEDICAL CENTER | Age: 55
End: 2025-06-05

## 2025-06-05 DIAGNOSIS — Z12.31 VISIT FOR SCREENING MAMMOGRAM: ICD-10-CM

## 2025-06-05 PROCEDURE — 77063 BREAST TOMOSYNTHESIS BI: CPT

## 2025-06-09 ENCOUNTER — APPOINTMENT (OUTPATIENT)
Dept: RADIOLOGY | Facility: MEDICAL CENTER | Age: 55
End: 2025-06-09
Attending: STUDENT IN AN ORGANIZED HEALTH CARE EDUCATION/TRAINING PROGRAM
Payer: COMMERCIAL

## 2025-06-10 ENCOUNTER — APPOINTMENT (OUTPATIENT)
Dept: URGENT CARE | Facility: CLINIC | Age: 55
End: 2025-06-10
Payer: COMMERCIAL

## 2025-06-10 ENCOUNTER — HOSPITAL ENCOUNTER (OUTPATIENT)
Dept: RADIOLOGY | Facility: MEDICAL CENTER | Age: 55
End: 2025-06-10
Attending: STUDENT IN AN ORGANIZED HEALTH CARE EDUCATION/TRAINING PROGRAM
Payer: COMMERCIAL

## 2025-06-10 DIAGNOSIS — R10.11 POSTPRANDIAL RUQ PAIN: ICD-10-CM

## 2025-06-10 DIAGNOSIS — R10.11 RUQ PAIN: ICD-10-CM

## 2025-06-10 PROCEDURE — 76705 ECHO EXAM OF ABDOMEN: CPT

## 2025-06-11 ENCOUNTER — RESULTS FOLLOW-UP (OUTPATIENT)
Dept: URGENT CARE | Facility: CLINIC | Age: 55
End: 2025-06-11

## 2025-06-12 ENCOUNTER — OFFICE VISIT (OUTPATIENT)
Dept: URGENT CARE | Facility: CLINIC | Age: 55
End: 2025-06-12
Payer: COMMERCIAL

## 2025-06-12 VITALS
SYSTOLIC BLOOD PRESSURE: 118 MMHG | OXYGEN SATURATION: 97 % | BODY MASS INDEX: 24.39 KG/M2 | WEIGHT: 146.4 LBS | HEIGHT: 65 IN | TEMPERATURE: 98.8 F | HEART RATE: 78 BPM | DIASTOLIC BLOOD PRESSURE: 70 MMHG | RESPIRATION RATE: 13 BRPM

## 2025-06-12 DIAGNOSIS — R10.13 DYSPEPSIA: Primary | ICD-10-CM

## 2025-06-12 DIAGNOSIS — R12 HEARTBURN: ICD-10-CM

## 2025-06-12 PROCEDURE — 3074F SYST BP LT 130 MM HG: CPT

## 2025-06-12 PROCEDURE — 3078F DIAST BP <80 MM HG: CPT

## 2025-06-12 PROCEDURE — 99213 OFFICE O/P EST LOW 20 MIN: CPT

## 2025-06-12 RX ORDER — OMEPRAZOLE 20 MG/1
20 CAPSULE, DELAYED RELEASE ORAL DAILY
COMMUNITY
Start: 2025-05-19 | End: 2025-06-12

## 2025-06-12 RX ORDER — OMEPRAZOLE 40 MG/1
40 CAPSULE, DELAYED RELEASE ORAL DAILY
Qty: 60 CAPSULE | Refills: 0 | Status: SHIPPED | OUTPATIENT
Start: 2025-06-12 | End: 2025-08-11

## 2025-06-12 ASSESSMENT — ENCOUNTER SYMPTOMS
EYE DISCHARGE: 0
HEARTBURN: 1
EYE PAIN: 0
WHEEZING: 0
NERVOUS/ANXIOUS: 0
VOMITING: 0
ABDOMINAL PAIN: 1
ORTHOPNEA: 0
PALPITATIONS: 0
SHORTNESS OF BREATH: 0
NAUSEA: 0
CONSTIPATION: 0
SPUTUM PRODUCTION: 0
CLAUDICATION: 0
COUGH: 0
DIARRHEA: 0
EYE REDNESS: 0
HEADACHES: 0
MYALGIAS: 0
DIZZINESS: 0
CHILLS: 0
FEVER: 0
PND: 0
STRIDOR: 0
BLOOD IN STOOL: 0
FLANK PAIN: 0
SORE THROAT: 0

## 2025-06-12 NOTE — PROGRESS NOTES
Subjective:   Theron Walker is a 55 y.o. female who presents for Results (ultrasound)          I introduced myself to the patient and informed them that I am a Family Nurse Practitioner.    HPI:Theron is a 55-year-old female who comes in today c/o continued feelings of dyspepsia/heartburn, states she was seen on 5/29/2025, ultrasound of abdomen was ordered which she did get done on 6/10/2025, she would like the results. Onset of her symptoms was greater than 6 months ago.  Patient describes symptoms as intermittent.  They describe the pain as burning in her stomach that comes up into her chest, sometimes affects her left shoulder. Aggravating factors include symptoms occur after eating, not just limited to fatty foods. Relieving factors include none. Treatments tried at home include  omeprazole with some relief . They describe their symptoms as moderate.  Chart review shows referral to gastroenterology was placed during her visit on 5/29/2025      Review of Systems   Constitutional:  Negative for chills, fever and malaise/fatigue.   HENT:  Negative for congestion, ear pain and sore throat.    Eyes:  Negative for pain, discharge and redness.   Respiratory:  Negative for cough, sputum production, shortness of breath, wheezing and stridor.    Cardiovascular:  Positive for chest pain. Negative for palpitations, orthopnea, claudication, leg swelling and PND.   Gastrointestinal:  Positive for abdominal pain and heartburn. Negative for blood in stool, constipation, diarrhea, melena, nausea and vomiting.   Genitourinary:  Negative for dysuria, flank pain, frequency, hematuria and urgency.   Musculoskeletal:  Negative for myalgias.   Skin:  Negative for rash.   Neurological:  Negative for dizziness and headaches.   Psychiatric/Behavioral:  The patient is not nervous/anxious.        Medications: reviewed with patient, updated med sheet    Allergies: Patient has no known allergies.    Problem List: does not have a problem  "list on file.    Surgical History:  No past surgical history on file.    Past Social Hx:   reports that she has never smoked. She has never used smokeless tobacco. She reports that she does not currently use alcohol. She reports that she does not use drugs.     Past Family Hx:   family history is not on file.     Problem list, medications, and allergies reviewed by myself today in Epic.   I have documented what I find to be significant in regards to past medical, social, family and surgical history  in my HPI or under PMH/PSH/FH review section, otherwise it is noncontributory     Objective:     /70 (BP Location: Left arm, Patient Position: Sitting, BP Cuff Size: Adult)   Pulse 78   Temp 37.1 °C (98.8 °F) (Temporal)   Resp 13   Ht 1.651 m (5' 5\")   Wt 66.4 kg (146 lb 6.4 oz)   SpO2 97%   BMI 24.36 kg/m²     During this visit, appropriate PPE was worn, and hand hygiene was performed.    Physical Exam  Vitals reviewed.   Constitutional:       General: She is not in acute distress.     Appearance: Normal appearance. She is normal weight. She is not ill-appearing or toxic-appearing.   HENT:      Head: Normocephalic and atraumatic.      Right Ear: External ear normal.      Left Ear: External ear normal.      Nose: No congestion or rhinorrhea.   Eyes:      General: No scleral icterus.        Right eye: No discharge.         Left eye: No discharge.      Extraocular Movements: Extraocular movements intact.      Conjunctiva/sclera: Conjunctivae normal.   Cardiovascular:      Rate and Rhythm: Normal rate.   Pulmonary:      Effort: Pulmonary effort is normal.   Abdominal:      General: Abdomen is flat. There is no distension.      Palpations: Abdomen is soft. There is no hepatomegaly, splenomegaly or mass.      Tenderness: There is no abdominal tenderness. There is no right CVA tenderness, left CVA tenderness, guarding or rebound.      Hernia: No hernia is present.       Genitourinary:     Comments: " Deferred  Musculoskeletal:         General: No swelling or tenderness. Normal range of motion.      Right lower leg: No edema.      Left lower leg: No edema.   Skin:     General: Skin is warm and dry.   Neurological:      General: No focal deficit present.      Mental Status: She is alert and oriented to person, place, and time. Mental status is at baseline.   Psychiatric:         Mood and Affect: Mood normal.         Behavior: Behavior normal.     EKG Interpretation:  Interpreted by me    Rhythm:  Normal sinus rhythm   Rate: 62  Axis: normal  Ectopy: none  Conduction: normal  ST Segments: no acute change  T Waves: no acute change  Q Waves: none  Clinical Impression: Normal EKG without acute changes or signs of ischemia     Assessment/Plan:     Diagnosis and associated orders:     1. Dyspepsia  EKG - Clinic Performed    omeprazole (PRILOSEC) 40 MG delayed-release capsule    CANCELED: Referral to Gastroenterology      2. Heartburn  EKG - Clinic Performed    omeprazole (PRILOSEC) 40 MG delayed-release capsule         Comments/MDM:     1. Dyspepsia (Primary)  - EKG - Clinic Performed  - omeprazole (PRILOSEC) 40 MG delayed-release capsule; Take 1 Capsule by mouth every day for 60 days.  Dispense: 60 Capsule; Refill: 0    2. Heartburn  Patient's presentation and symptoms as well as physical exam are consistent with heartburn/dyspepsia  I did get an EKG as patient did complain of some lower central chest pain after eating.  This is negative for any signs of ischemia  discussed with patient Dx,  DDx, management options (risks,benefits, and alternatives to planned treatment), natural progression.    Supportive care measures were discussed.   Patient is currently taking omeprazole 20 mg daily which she says does give her some measure of relief although not much.  Suggested to her we can increase this dose to 40 mg daily which may give her more relief, encouraged her to follow-up with gastroenterology.  Per chart review I see  that referral was made during her last visit to urgent care.  I did give her a copy of the referral with the phone number to call and encouraged her to call to make an appointment.  I did discuss thoroughly with her the ultrasound results of her ultrasound right upper quadrant, unremarkable study.  She states she understands.  Questions were encouraged and answered.   Written information was provided and I did go over this with the patient in clinic today.  Instructed patient regarding red flags and to return to urgent care prn if new or worsening sx or if there is no improvement in condition prn.    Advised the patient to follow-up with the primary care physician for recheck, reevaluation, and consideration of further management.  I did instruct patient regarding medications prescribed, purpose, side effects, precautions.  Instructed patient to get a pharmacy consult when picking up any prescribed medications.  Strict ER precautions discussed for any worsening symptoms, increased abdominal pain/chest pain, difficulty breathing, inability to tolerate fluids, uncontrolled fever, chills, nausea or vomiting, lethargy   Patient states they have good understanding and they are agreeable with the plan of care.     - EKG - Clinic Performed  - omeprazole (PRILOSEC) 40 MG delayed-release capsule; Take 1 Capsule by mouth every day for 60 days.  Dispense: 60 Capsule; Refill: 0           Pt is clinically stable at today's acute urgent care visit. Vital signs are normal and reassuring.  No acute distress noted. There was no immediate clinical indication for the necessity of emergency department evaluation or inpatient admission.  Appropriate for outpatient management at this time. Patient was amendable to a trial of outpatient management.        I personally reviewed prior external notes and test results pertinent to today's visit.  I have independently reviewed and interpreted all diagnostics ordered during this urgent care  acute visit.        Please note that this dictation was created using voice recognition software. I have made a reasonable attempt to correct obvious errors, but I expect that there are errors of grammar and possibly content that I did not discover before finalizing the note.    This note was electronically signed by Dio SAGE, SHAY, ED, FLAVIO

## 2025-06-24 ENCOUNTER — HOSPITAL ENCOUNTER (OUTPATIENT)
Dept: RADIOLOGY | Facility: MEDICAL CENTER | Age: 55
End: 2025-06-24
Attending: STUDENT IN AN ORGANIZED HEALTH CARE EDUCATION/TRAINING PROGRAM
Payer: COMMERCIAL

## 2025-06-24 ENCOUNTER — RESULTS FOLLOW-UP (OUTPATIENT)
Dept: MEDICAL GROUP | Facility: IMAGING CENTER | Age: 55
End: 2025-06-24

## 2025-06-24 DIAGNOSIS — R92.8 ABNORMAL MAMMOGRAM: ICD-10-CM

## 2025-06-24 PROCEDURE — 76642 ULTRASOUND BREAST LIMITED: CPT | Mod: RT

## 2025-06-24 PROCEDURE — G0279 TOMOSYNTHESIS, MAMMO: HCPCS
